# Patient Record
Sex: MALE | Race: BLACK OR AFRICAN AMERICAN
[De-identification: names, ages, dates, MRNs, and addresses within clinical notes are randomized per-mention and may not be internally consistent; named-entity substitution may affect disease eponyms.]

---

## 2018-11-16 ENCOUNTER — HOSPITAL ENCOUNTER (EMERGENCY)
Dept: HOSPITAL 61 - ER | Age: 33
Discharge: HOME | End: 2018-11-16
Payer: COMMERCIAL

## 2018-11-16 VITALS — SYSTOLIC BLOOD PRESSURE: 104 MMHG | DIASTOLIC BLOOD PRESSURE: 51 MMHG

## 2018-11-16 VITALS — BODY MASS INDEX: 34.07 KG/M2 | HEIGHT: 69 IN | WEIGHT: 230 LBS
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
## 2018-11-16 DIAGNOSIS — S90.212A: Primary | ICD-10-CM
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
## 2018-11-16 DIAGNOSIS — E10.69: ICD-10-CM

## 2018-11-16 LAB
ACETONE SERPL-MCNC: (no result) MG/DL
ALBUMIN SERPL-MCNC: 3.5 G/DL (ref 3.4–5)
ALBUMIN/GLOB SERPL: 1 {RATIO} (ref 1–1.7)
ALP SERPL-CCNC: 69 U/L (ref 46–116)
ALT SERPL-CCNC: 31 U/L (ref 16–63)
ANION GAP SERPL CALC-SCNC: 10 MMOL/L (ref 6–14)
APTT PPP: YELLOW S
AST SERPL-CCNC: 11 U/L (ref 15–37)
BACTERIA #/AREA URNS HPF: 0 /HPF
BASOPHILS # BLD AUTO: 0 X10^3/UL (ref 0–0.2)
BASOPHILS NFR BLD: 0 % (ref 0–3)
BILIRUB SERPL-MCNC: 0.7 MG/DL (ref 0.2–1)
BILIRUB UR QL STRIP: NEGATIVE
BUN SERPL-MCNC: 20 MG/DL (ref 8–26)
BUN/CREAT SERPL: 17 (ref 6–20)
CALCIUM SERPL-MCNC: 8.8 MG/DL (ref 8.5–10.1)
CHLORIDE SERPL-SCNC: 99 MMOL/L (ref 98–107)
CO2 SERPL-SCNC: 26 MMOL/L (ref 21–32)
CREAT SERPL-MCNC: 1.2 MG/DL (ref 0.7–1.3)
EOSINOPHIL NFR BLD: 0.1 X10^3/UL (ref 0–0.7)
EOSINOPHIL NFR BLD: 2 % (ref 0–3)
ERYTHROCYTE [DISTWIDTH] IN BLOOD BY AUTOMATED COUNT: 12.7 % (ref 11.5–14.5)
FIBRINOGEN PPP-MCNC: CLEAR MG/DL
GFR SERPLBLD BASED ON 1.73 SQ M-ARVRAT: 85.4 ML/MIN
GLOBULIN SER-MCNC: 3.5 G/DL (ref 2.2–3.8)
GLUCOSE SERPL-MCNC: 618 MG/DL (ref 70–99)
HCT VFR BLD CALC: 39.1 % (ref 39–53)
HGB BLD-MCNC: 13.2 G/DL (ref 13–17.5)
LYMPHOCYTES # BLD: 2.2 X10^3/UL (ref 1–4.8)
LYMPHOCYTES NFR BLD AUTO: 51 % (ref 24–48)
MCH RBC QN AUTO: 28 PG (ref 25–35)
MCHC RBC AUTO-ENTMCNC: 34 G/DL (ref 31–37)
MCV RBC AUTO: 82 FL (ref 79–100)
MONO #: 0.4 X10^3/UL (ref 0–1.1)
MONOCYTES NFR BLD: 10 % (ref 0–9)
NEUT #: 1.6 X10^3UL (ref 1.8–7.7)
NEUTROPHILS NFR BLD AUTO: 37 % (ref 31–73)
NITRITE UR QL STRIP: NEGATIVE
PH UR STRIP: 6 [PH]
PLATELET # BLD AUTO: 203 X10^3/UL (ref 140–400)
POTASSIUM SERPL-SCNC: 4.5 MMOL/L (ref 3.5–5.1)
PROT SERPL-MCNC: 7 G/DL (ref 6.4–8.2)
PROT UR STRIP-MCNC: NEGATIVE MG/DL
PROTHROMBIN TIME: 12.2 SEC (ref 11.7–14)
RBC # BLD AUTO: 4.78 X10^6/UL (ref 4.3–5.7)
RBC #/AREA URNS HPF: (no result) /HPF (ref 0–2)
SODIUM SERPL-SCNC: 135 MMOL/L (ref 136–145)
SQUAMOUS #/AREA URNS LPF: (no result) /LPF
UROBILINOGEN UR-MCNC: 0.2 MG/DL
WBC # BLD AUTO: 4.3 X10^3/UL (ref 4–11)
WBC #/AREA URNS HPF: 0 /HPF (ref 0–4)

## 2018-11-16 PROCEDURE — 82962 GLUCOSE BLOOD TEST: CPT

## 2018-11-16 PROCEDURE — 73660 X-RAY EXAM OF TOE(S): CPT

## 2018-11-16 PROCEDURE — 85610 PROTHROMBIN TIME: CPT

## 2018-11-16 PROCEDURE — 82010 KETONE BODYS QUAN: CPT

## 2018-11-16 PROCEDURE — 93005 ELECTROCARDIOGRAM TRACING: CPT

## 2018-11-16 PROCEDURE — 80053 COMPREHEN METABOLIC PANEL: CPT

## 2018-11-16 PROCEDURE — 96374 THER/PROPH/DIAG INJ IV PUSH: CPT

## 2018-11-16 PROCEDURE — 85025 COMPLETE CBC W/AUTO DIFF WBC: CPT

## 2018-11-16 PROCEDURE — 99285 EMERGENCY DEPT VISIT HI MDM: CPT

## 2018-11-16 PROCEDURE — 81001 URINALYSIS AUTO W/SCOPE: CPT

## 2018-11-16 PROCEDURE — 36415 COLL VENOUS BLD VENIPUNCTURE: CPT

## 2018-11-16 NOTE — RAD
EXAM: AP, oblique and lateral views of the left toes

 

DATE: 11/16/2018 1:04 AM

 

INDICATION: bruising under the left great toe nail

 

COMPARISON: No Prior

 

FINDINGS/

IMPRESSION:

No evidence of acute fracture or dislocation of the left toes. Mild soft 

tissue swelling about the left great toe.

 

Electronically signed by: Wesley More MD (11/16/2018 8:06 AM) [facility]

## 2018-11-16 NOTE — EKG
[facility]

              [address] [place] [address]

Test Date:    2018               Test Time:    01:05:35

Pat Name:     SUJEY VU            Department:   

Patient ID:   [id number]           Room:          

Gender:       M                        Technician:   

:          1985               Requested By: ELLY ROBLES

Order Number: [id number]           Reading MD:   Lew Sotelo

                                 Measurements

Intervals                              Axis          

Rate:         95                       P:            90

NH:           142                      QRS:          49

QRSD:         100                      T:            56

QT:           354                                    

QTc:          448                                    

                           Interpretive Statements

SINUS RHYTHM



Electronically Signed On 2018 14:32:22 CST by Lew Sotelo

## 2018-11-16 NOTE — PHYS DOC
Past Medical History


Past Medical History:  Diabetes-Type I


Past Surgical History:  Other


Additional Past Surgical Histo:  left foot surgery


Alcohol Use:  None


Drug Use:  None





Adult General


Chief Complaint


Chief Complaint:  TOE PROBLEM





HPI


HPI





Patient is a 33  year old male who presents with bleeding from his left great 

toe nail. This started approximately 2300. Patient noticed it as he was getting 

into the shower. Patient does not recall any trauma. However, his feet are 

relatively numb from complications of his diabetes. Patient has also not been 

taking his insulin recently. Patient denies any fever. Denies any pain. 

Pressure seems to make the bleeding better. Denies any pulsatile bleeding. 

Patient has remote history of ulcers on his feet as well as a remote history of 

a burn on his left ankle. Neither of these are causing issues at this time.[]





Review of Systems


Review of Systems





Constitutional: Denies fever or chills []


Eyes: Denies change in visual acuity, redness, or eye pain []


HENT: Denies nasal congestion or sore throat []


Respiratory: Denies cough or shortness of breath []


Cardiovascular: No chest pain or palpitations[]


GI: Denies abdominal pain, nausea, vomiting, bloody stools or diarrhea []


: Denies dysuria or hematuria []


Musculoskeletal: Denies back pain or joint pain []


Integument: Denies rash or skin lesions, see history of present illness []


Neurologic: Denies headache, focal weakness or sensory changes []


Endocrine: Denies polyuria or polydipsia []





All other systems were reviewed and found to be within normal limits, except as 

documented in this note.





Current Medications


Current Medications





Current Medications








 Medications


  (Trade)  Dose


 Ordered  Sig/Cj  Start Time


 Stop Time Status Last Admin


Dose Admin


 


 Insulin Human


 Regular


  (HumuLIN R VIAL)  10 unit  1X  ONCE  11/16/18 00:45


 11/16/18 00:46 DC 11/16/18 00:53


10 UNIT


 


 Sodium Chloride  1,000 ml @ 


 1,000 mls/hr  1X  ONCE  11/16/18 00:45


 11/16/18 01:44   














Allergies


Allergies





Allergies








Coded Allergies Type Severity Reaction Last Updated Verified


 


  No Known Drug Allergies    4/12/14 No











Physical Exam


Physical Exam





Constitutional: Well developed, well nourished, no acute distress, non-toxic 

appearance. []


HENT: Normocephalic, atraumatic, bilateral external ears normal, oropharynx 

moist, no oral exudates, nose normal. []


Eyes: PERRLA, EOMI, conjunctiva normal, no discharge. [] 


Neck: Normal range of motion, no tenderness, supple, no stridor. [] 


Cardiovascular:Heart rate regular rhythm, no murmur []


Lungs & Thorax:  Bilateral breath sounds clear to auscultation []


Abdomen: Not evaluated. [] 


Skin: Warm, dry, no erythema, no rash. [] 


Back: No tenderness, no CVA tenderness. [] 


Extremities: No tenderness, no cyanosis, no clubbing, ROM intact, no edema. 

There is a subungual hematoma left great toe, proximal two thirds, with 

spontaneous drainage from the nail. There is no pain with axial loading. There 

are no skin lesions present. [] 


Neurologic: Alert and oriented X 3, normal motor function, normal sensory 

function, no focal deficits noted. []


Psychologic: Affect normal, judgement normal, mood normal. []





Current Patient Data


Vital Signs





 Vital Signs








  Date Time  Temp Pulse Resp B/P (MAP) Pulse Ox O2 Delivery O2 Flow Rate FiO2


 


11/16/18 00:20 97.7 97 16 143/73 (96) 98 Room Air  





 97.7       








Lab Values





 Laboratory Tests








Test


 11/16/18


00:36 11/16/18


00:45


 


Glucose (Fingerstick)


 544 mg/dL


(70-99)  *H 





 


White Blood Count


 


 4.3 x10^3/uL


(4.0-11.0)


 


Red Blood Count


 


 4.78 x10^6/uL


(4.30-5.70)


 


Hemoglobin


 


 13.2 g/dL


(13.0-17.5)


 


Hematocrit


 


 39.1 %


(39.0-53.0)


 


Mean Corpuscular Volume


 


 82 fL ()





 


Mean Corpuscular Hemoglobin  28 pg (25-35)  


 


Mean Corpuscular Hemoglobin


Concent 


 34 g/dL


(31-37)


 


Red Cell Distribution Width


 


 12.7 %


(11.5-14.5)


 


Platelet Count


 


 203 x10^3/uL


(140-400)


 


Neutrophils (%) (Auto)  37 % (31-73)  


 


Lymphocytes (%) (Auto)  51 % (24-48)  H


 


Monocytes (%) (Auto)  10 % (0-9)  H


 


Eosinophils (%) (Auto)  2 % (0-3)  


 


Basophils (%) (Auto)  0 % (0-3)  


 


Neutrophils # (Auto)


 


 1.6 x10^3uL


(1.8-7.7)  L


 


Lymphocytes # (Auto)


 


 2.2 x10^3/uL


(1.0-4.8)


 


Monocytes # (Auto)


 


 0.4 x10^3/uL


(0.0-1.1)


 


Eosinophils # (Auto)


 


 0.1 x10^3/uL


(0.0-0.7)


 


Basophils # (Auto)


 


 0.0 x10^3/uL


(0.0-0.2)





 Laboratory Tests


11/16/18 00:45














EKG


EKG


EKG AT 0105 shows normal sinus rhythm at 95 bpm normal axis, QTC of 448 ms, no 

ST elevation[]





Radiology/Procedures


Radiology/Procedures


X-ray of the toes of the left foot shows no acute fracture or dislocation[]





Course & Med Decision Making


Course & Med Decision Making


Pertinent Labs and Imaging studies reviewed. (See chart for details)





[]





Dragon Disclaimer


Dragon Disclaimer


This electronic medical record was generated, in whole or in part, using a 

voice recognition dictation system.





Departure


Departure


Referrals:  


NO PCP (PCP)











ELLY ROBLES DO Nov 16, 2018 01:30